# Patient Record
Sex: MALE | Race: WHITE | Employment: FULL TIME | URBAN - METROPOLITAN AREA
[De-identification: names, ages, dates, MRNs, and addresses within clinical notes are randomized per-mention and may not be internally consistent; named-entity substitution may affect disease eponyms.]

---

## 2017-01-05 ENCOUNTER — THERAPY VISIT (OUTPATIENT)
Dept: OCCUPATIONAL THERAPY | Facility: CLINIC | Age: 62
End: 2017-01-05
Payer: OTHER MISCELLANEOUS

## 2017-01-05 DIAGNOSIS — M25.522 ELBOW PAIN, LEFT: Primary | ICD-10-CM

## 2017-01-05 DIAGNOSIS — M77.12 LATERAL EPICONDYLITIS, LEFT ELBOW: ICD-10-CM

## 2017-01-05 PROCEDURE — 97110 THERAPEUTIC EXERCISES: CPT | Mod: GO | Performed by: OCCUPATIONAL THERAPIST

## 2017-01-05 PROCEDURE — 97112 NEUROMUSCULAR REEDUCATION: CPT | Mod: GO | Performed by: OCCUPATIONAL THERAPIST

## 2017-01-05 PROCEDURE — 97140 MANUAL THERAPY 1/> REGIONS: CPT | Mod: GO | Performed by: OCCUPATIONAL THERAPIST

## 2017-01-13 ENCOUNTER — THERAPY VISIT (OUTPATIENT)
Dept: OCCUPATIONAL THERAPY | Facility: CLINIC | Age: 62
End: 2017-01-13
Payer: OTHER MISCELLANEOUS

## 2017-01-13 DIAGNOSIS — M25.522 ELBOW PAIN, LEFT: Primary | ICD-10-CM

## 2017-01-13 DIAGNOSIS — M77.12 LATERAL EPICONDYLITIS, LEFT ELBOW: ICD-10-CM

## 2017-01-13 PROCEDURE — 97140 MANUAL THERAPY 1/> REGIONS: CPT | Mod: GO | Performed by: OCCUPATIONAL THERAPIST

## 2017-01-18 ENCOUNTER — TELEPHONE (OUTPATIENT)
Dept: OCCUPATIONAL THERAPY | Facility: CLINIC | Age: 62
End: 2017-01-18

## 2017-01-18 DIAGNOSIS — M25.522 ELBOW PAIN, LEFT: Primary | ICD-10-CM

## 2017-01-18 DIAGNOSIS — M77.12 LATERAL EPICONDYLITIS, LEFT ELBOW: ICD-10-CM

## 2017-01-20 ENCOUNTER — THERAPY VISIT (OUTPATIENT)
Dept: OCCUPATIONAL THERAPY | Facility: CLINIC | Age: 62
End: 2017-01-20
Payer: OTHER MISCELLANEOUS

## 2017-01-20 DIAGNOSIS — M25.522 ELBOW PAIN, LEFT: Primary | ICD-10-CM

## 2017-01-20 DIAGNOSIS — M77.12 LATERAL EPICONDYLITIS, LEFT ELBOW: ICD-10-CM

## 2017-01-20 PROCEDURE — 97110 THERAPEUTIC EXERCISES: CPT | Mod: GO | Performed by: OCCUPATIONAL THERAPIST

## 2017-01-20 PROCEDURE — 97140 MANUAL THERAPY 1/> REGIONS: CPT | Mod: GO | Performed by: OCCUPATIONAL THERAPIST

## 2017-01-20 PROCEDURE — 97112 NEUROMUSCULAR REEDUCATION: CPT | Mod: GO | Performed by: OCCUPATIONAL THERAPIST

## 2017-01-20 NOTE — PROGRESS NOTES
Hand Therapy Progress Note  Current Date:  1/20/17  Reporting Period: 12/21/2016 to 1/20/2017    Subjective:  Good through the weekend, and it builds worse and worse tooards Friday.  Anton Kaminski is a 61 year old right hand dominant male.    Diagnosis: L LEP  DOI: building for 5-6 months ago (MD order date 12/19/16)    S:  Subjective changes as noted by patient: I am doing ok.  I think it is slowly getting better.  Functional changes noted by patient: able to do some things, but when I increase use at work it starts to hurt more.     Response to previous treatment:  good  Patient has noted adverse reaction to:   None      Objective:  Pain Level Report: On scale 0-10/10  Date 12/21/2016 1/20/17   side L L   Overall 2 1   At Rest 0 0   With Activity 2 1     Primary Report: location and description  Date 12/21/2016 1/20/17   Side L L   Location LEP LEP   Radiation Slightly distally into the extensor wad and into biceps Extensor wad, into forearm   Pain Quality achy achy   Frequency intermittent Intermittant   Duration Dependent on activity During daytime, through work   Exacerbated by  Lifting, gripping, twisting Lifting, gripping, twisting, vibration   Relieved by ice heat   Progression since onset Gradually worsening Gradually getting better      Tenderness: Pain level report on scale 0-10/10  Date 12/21/16 1/20/17   Side L L   Lateral Epicondyle 2 3   PIN 4 3   ECRB Insertion 0 0     Palpation Radial Nerve Path: Pain level report on scale 0-10/10  Date 12/21/16    Side L    Supraspinatus 0    Triangular Interval 0    Spiral Groove 0    Radial Head 0    DSRN 0      Range of Motion Wrist and  Elbow AROM (PROM): WNL    AROM Multi-joint:  Date 12/21/2016 12/21/2016   Side L R   Elbow - FA - Wrist     90 - Neut - Flex 50 51   90 -  Pro  - Flex 53 58   Ext - Neut - Flex 54 56   Ext -  Pro  - Flex 51 51     Resisted Testing: MMT on scale 0-5/5, Pain level report on scale 0-10/10  Date 12/21/2016 1/20/17   Side L L   Elbow  Ext 4/5, 2/10 5/5, 0/10   Elbow Flex 4/5, 1/10 4/5, 0/10   Sup 3+/5, 3/10 4/5, 2-3/10   Pro 5/5, 0/10 NT   Wrist Ext 4/5, 1/10 4/5, 0/10   Wrist Flex 5/5, 0/10 NT   Wrist Ext c elbow extended 4/5, 1/10 4/5, 3/10   Middle Finger test 5/5, 1/10 5/5, 0/10     Strength: (Measured in pounds, pain scale 0-10/10)   with Elbow at 90: measured in pounds  Date 12/21/2016 1/20/17       Trials Left Right Left Right Left Right Left Right Left Right Left Right   1 80 72 WNL            2               3               Avg               Pain 0                 with Elbow Extended: measured in pounds  Date 12/21/2016 1/20/17       Trials Left Right Left Right Left Right Left Right Left Right Left Right   1 80 85 73 95           2               3               Avg               Pain 1                Assessment:  Response to therapy has been improvement to:  Flexibility:  improved excursion of involved muscles and less tightness in involved muscles  Strength:   and pinch  Pain:  frequency is less, intensity of pain is decreased, duration of pain is decreased and less tender over affected area    Overall Assessment:  Patient's symptoms are resolving.  Patient is ready to progress to more complex exercises.  Patient is ready to progress to next level of protocol.  Patient is becoming more independent in home exercise program  Patient would benefit from continued therapy to achieve rehab potential  STG/LTG:  STGoals have been reviewed;  see goal sheet for details and updates.  LTGoals have been reviewed;  see goal sheet for details and updates.    I have re-evaluated this patient and find that the nature, scope, duration and intensity of the therapy is appropriate for the medical condition of the patient.    P:  Frequency:  1 X week, once daily  Duration:  for 4 weeks    Treatment Plan:    Modalities:  US   Therapeutic Exercise: AROM of elbow and wrist, PROM with stretch to wrist extensors and flexors,  ECRL strengthening  progressing to ECRB strengthening (eccentric progressing to concentric ).   Manual Techniques: Radial head mobilization, interosseous membrane glide, friction massage, Myofascial release of the forearm extensors and flexors  Neuro Re-ed: Active/Passive radial nerve glides  Orthosis:  Wrist cock up orthosis     Home Program:   Exercise: AROM of elbow and wrist, PROM with stretch to wrist extensors and flexors, Active radial nerve glides  MFR to the extensor wad  Heat to extensors PRN  Activity: Avoid activities that exacerbate pain in the elbow.  Lift with forearms in neutral position.     Discharge Plan:    Achieve all LTG.  Independent in home treatment program.  Reach maximal therapeutic benefit.    Next Visit:  MFR  Passive radial nerve glides  Passive stretching

## 2017-01-31 ENCOUNTER — THERAPY VISIT (OUTPATIENT)
Dept: OCCUPATIONAL THERAPY | Facility: CLINIC | Age: 62
End: 2017-01-31
Payer: OTHER MISCELLANEOUS

## 2017-01-31 DIAGNOSIS — M25.522 ELBOW PAIN, LEFT: Primary | ICD-10-CM

## 2017-01-31 DIAGNOSIS — M77.12 LATERAL EPICONDYLITIS, LEFT ELBOW: ICD-10-CM

## 2017-01-31 PROCEDURE — 97140 MANUAL THERAPY 1/> REGIONS: CPT | Mod: GO | Performed by: OCCUPATIONAL THERAPIST

## 2017-01-31 PROCEDURE — 97112 NEUROMUSCULAR REEDUCATION: CPT | Mod: GO | Performed by: OCCUPATIONAL THERAPIST

## 2017-02-10 ENCOUNTER — THERAPY VISIT (OUTPATIENT)
Dept: OCCUPATIONAL THERAPY | Facility: CLINIC | Age: 62
End: 2017-02-10
Payer: OTHER MISCELLANEOUS

## 2017-02-10 DIAGNOSIS — M77.12 LATERAL EPICONDYLITIS, LEFT ELBOW: ICD-10-CM

## 2017-02-10 DIAGNOSIS — M25.522 ELBOW PAIN, LEFT: Primary | ICD-10-CM

## 2017-02-10 PROCEDURE — 97112 NEUROMUSCULAR REEDUCATION: CPT | Mod: GO | Performed by: OCCUPATIONAL THERAPIST

## 2017-02-10 PROCEDURE — 97140 MANUAL THERAPY 1/> REGIONS: CPT | Mod: GO | Performed by: OCCUPATIONAL THERAPIST

## 2017-02-15 ENCOUNTER — THERAPY VISIT (OUTPATIENT)
Dept: OCCUPATIONAL THERAPY | Facility: CLINIC | Age: 62
End: 2017-02-15
Payer: OTHER MISCELLANEOUS

## 2017-02-15 ENCOUNTER — TELEPHONE (OUTPATIENT)
Dept: OCCUPATIONAL THERAPY | Facility: CLINIC | Age: 62
End: 2017-02-15

## 2017-02-15 DIAGNOSIS — M25.522 ELBOW PAIN, LEFT: ICD-10-CM

## 2017-02-15 DIAGNOSIS — M77.12 LATERAL EPICONDYLITIS, LEFT ELBOW: ICD-10-CM

## 2017-02-15 PROCEDURE — 97112 NEUROMUSCULAR REEDUCATION: CPT | Mod: GO | Performed by: OCCUPATIONAL THERAPIST

## 2017-02-15 PROCEDURE — 97140 MANUAL THERAPY 1/> REGIONS: CPT | Mod: GO | Performed by: OCCUPATIONAL THERAPIST

## 2017-02-24 ENCOUNTER — THERAPY VISIT (OUTPATIENT)
Dept: OCCUPATIONAL THERAPY | Facility: CLINIC | Age: 62
End: 2017-02-24
Payer: OTHER MISCELLANEOUS

## 2017-02-24 DIAGNOSIS — M25.522 ELBOW PAIN, LEFT: ICD-10-CM

## 2017-02-24 DIAGNOSIS — M77.12 LATERAL EPICONDYLITIS, LEFT ELBOW: ICD-10-CM

## 2017-02-24 PROCEDURE — 97112 NEUROMUSCULAR REEDUCATION: CPT | Mod: GO | Performed by: OCCUPATIONAL THERAPIST

## 2017-02-24 PROCEDURE — 97140 MANUAL THERAPY 1/> REGIONS: CPT | Mod: GO | Performed by: OCCUPATIONAL THERAPIST

## 2017-03-10 ENCOUNTER — THERAPY VISIT (OUTPATIENT)
Dept: OCCUPATIONAL THERAPY | Facility: CLINIC | Age: 62
End: 2017-03-10
Payer: OTHER MISCELLANEOUS

## 2017-03-10 DIAGNOSIS — M77.12 LATERAL EPICONDYLITIS, LEFT ELBOW: ICD-10-CM

## 2017-03-10 DIAGNOSIS — M25.522 ELBOW PAIN, LEFT: ICD-10-CM

## 2017-03-10 PROCEDURE — 97140 MANUAL THERAPY 1/> REGIONS: CPT | Mod: GO | Performed by: OCCUPATIONAL THERAPIST

## 2017-03-10 PROCEDURE — 97110 THERAPEUTIC EXERCISES: CPT | Mod: GO | Performed by: OCCUPATIONAL THERAPIST

## 2017-03-17 ENCOUNTER — THERAPY VISIT (OUTPATIENT)
Dept: OCCUPATIONAL THERAPY | Facility: CLINIC | Age: 62
End: 2017-03-17
Payer: OTHER MISCELLANEOUS

## 2017-03-17 DIAGNOSIS — M77.12 LATERAL EPICONDYLITIS, LEFT ELBOW: ICD-10-CM

## 2017-03-17 DIAGNOSIS — M25.522 ELBOW PAIN, LEFT: ICD-10-CM

## 2017-03-17 PROCEDURE — 97110 THERAPEUTIC EXERCISES: CPT | Mod: GO | Performed by: OCCUPATIONAL THERAPIST

## 2017-03-17 PROCEDURE — 97140 MANUAL THERAPY 1/> REGIONS: CPT | Mod: GO | Performed by: OCCUPATIONAL THERAPIST

## 2017-03-17 NOTE — PROGRESS NOTES
Hand Therapy Progress Note  Current Date:  3/17/17  Reporting Period:  1/20/2017 3/17/17    S:  Subjective changes as noted by patient: Up and down throughout the week, bad on Wednesday, but after some hammering it's painful.  Functional changes noted by patient: able to do some things, but when I increase use at work it starts to hurt more.     Response to previous treatment:  good  Patient has noted adverse reaction to:   None      Objective:  Pain Level Report: On scale 0-10/10  Date 12/21/2016 1/20/17 3/17/17   side L L L   Overall 2 1 2   At Rest 0 0 0   With Activity 2 1 2     Primary Report: location and description  Date 12/21/2016 1/20/17 3/17/17   Side L L L   Location LEP LEP LEP   Radiation Slightly distally into the extensor wad and into biceps Extensor wad, into forearm None   Pain Quality achy achy Achy, tender   Frequency intermittent Intermittant INtermittant   Duration Dependent on activity During daytime, through work Worst at work   Exacerbated by  Lifting, gripping, twisting Lifting, gripping, twisting, vibration Lifting, gripping, twisting, vibration   Relieved by ice heat Ice   Progression since onset Gradually worsening Gradually getting better Unchanged      Tenderness: Pain level report on scale 0-10/10  Date 12/21/16 1/20/17 3/17/17   Side L L L   Lateral Epicondyle 2 3 2-3   PIN 4 3 3   ECRB Insertion 0 0 0     Palpation Radial Nerve Path: Pain level report on scale 0-10/10  Date 12/21/16    Side L    Supraspinatus 0    Triangular Interval 0    Spiral Groove 0    Radial Head 0    DSRN 0      Range of Motion Wrist and  Elbow AROM (PROM): WNL    AROM Multi-joint:  Date 12/21/2016 12/21/2016 3/17/17   Side L R L   Elbow - FA - Wrist      90 - Neut - Flex 50 51 47   90 -  Pro  - Flex 53 58 60   Ext - Neut - Flex 54 56 50   Ext -  Pro  - Flex 51 51 57     Resisted Testing: MMT on scale 0-5/5, Pain level report on scale 0-10/10  Date 12/21/2016 1/20/17 3/17/17   Side L L L   Elbow Ext 4/5, 2/10  5/5, 0/10 NT   Elbow Flex 4/5, 1/10 4/5, 0/10 5/5, 0/10   Sup 3+/5, 3/10 4/5, 2-3/10 5/5, 2/10   Pro 5/5, 0/10 NT NT   Wrist Ext 4/5, 1/10 4/5, 0/10 4/5, 2/10   Wrist Flex 5/5, 0/10 NT NT   Wrist Ext c elbow extended 4/5, 1/10 4/5, 3/10 4/5, 3-4/5   Middle Finger test 5/5, 1/10 5/5, 0/10 4/5, 3-4/10     Strength: (Measured in pounds, pain scale 0-10/10)   with Elbow at 90: measured in pounds  Date 12/21/2016 1/20/17       Trials Left Right Left Right Left Right Left Right Left Right Left Right   1 80 72 WNL            2               3               Avg               Pain 0                 with Elbow Extended: measured in pounds  Date 12/21/2016 1/20/17 3/17/17      Trials Left Right Left Right Left Right Left Right Left Right Left Right   1 80 85 73 95 51 88         2               3               Avg               Pain 1    3            Assessment:  Response to therapy has been lack of progress in:  Strength:   and pinch  Pain:  Generally unchanged    Overall Assessment:  Patient would benefit from continued therapy to achieve rehab potential  STG/LTG:  STGoals have been reviewed;  see goal sheet for details and updates.  LTGoals have been reviewed;  see goal sheet for details and updates.    I have re-evaluated this patient and find that the nature, scope, duration and intensity of the therapy is appropriate for the medical condition of the patient.    P:  Frequency:  1 X week, once daily  Duration:  for 6 weeks    Treatment Plan:    Modalities:  US   Therapeutic Exercise: AROM of elbow and wrist, PROM with stretch to wrist extensors and flexors,  ECRL strengthening progressing to ECRB strengthening (eccentric progressing to concentric ).   Manual Techniques: Radial head mobilization, interosseous membrane glide, friction massage, Myofascial release of the forearm extensors and flexors  Neuro Re-ed: Active/Passive radial nerve glides  Orthosis:  Wrist cock up orthosis     Home Program:   Exercise:  AROM of elbow and wrist, PROM with stretch to wrist extensors and flexors, Active radial nerve glides  MFR to the extensor wad  Heat to extensors PRN  Activity: Avoid activities that exacerbate pain in the elbow.  Lift with forearms in neutral position.     Discharge Plan:    Achieve all LTG.  Independent in home treatment program.  Reach maximal therapeutic benefit.    Next Visit:  MFR  Passive radial nerve glides  Passive stretching

## 2017-03-20 ENCOUNTER — OFFICE VISIT (OUTPATIENT)
Dept: ORTHOPEDICS | Facility: CLINIC | Age: 62
End: 2017-03-20
Payer: OTHER MISCELLANEOUS

## 2017-03-20 VITALS
SYSTOLIC BLOOD PRESSURE: 138 MMHG | HEIGHT: 70 IN | BODY MASS INDEX: 33.36 KG/M2 | WEIGHT: 233 LBS | DIASTOLIC BLOOD PRESSURE: 86 MMHG

## 2017-03-20 DIAGNOSIS — M77.12 LATERAL EPICONDYLITIS OF LEFT ELBOW: Primary | ICD-10-CM

## 2017-03-20 PROCEDURE — 29065 APPL CST SHO TO HAND LNG ARM: CPT | Mod: LT | Performed by: ORTHOPAEDIC SURGERY

## 2017-03-20 PROCEDURE — 99212 OFFICE O/P EST SF 10 MIN: CPT | Mod: 25 | Performed by: ORTHOPAEDIC SURGERY

## 2017-03-20 NOTE — LETTER
"  3/20/2017       RE: Anton Kaminski  [address] [place] [address]           Dear Colleague,    Thank you for referring your patient, Anton Kaminski, to the [facility]. Please see a copy of my visit note below.    HISTORY OF PRESENT ILLNESS:    Anton Kaminski is a 62 year old male who is seen in follow up for left elbow, lateral epicondylitis (WC).  Present symptoms: Pt states elbow has good days and bad, today is a good day.  Pt continues to have pain over the lateral aspect of the elbow.  Treatments tried to this point: hand therapy, cortisone injections  Past Medical History: Unchanged from the visit of 11/3/2016. Please refer to that note.    REVIEW OF SYSTEMS:  CONSTITUTIONAL: NEGATIVE for fever, chills, change in weight  INTEGUMENTARY/SKIN: NEGATIVE for worrisome rashes, moles or lesions  EYES: NEGATIVE for vision changes or irritation  ENT/MOUTH: NEGATIVE for ear, mouth and throat problems  RESP: NEGATIVE for significant cough or SOB  BREAST: NEGATIVE for masses, tenderness or discharge  CV: NEGATIVE for chest pain, palpitations or peripheral edema  GI: NEGATIVE for nausea, abdominal pain, heartburn, or change in bowel habits  : Negative   MUSCULOSKELETAL: See HPI above  NEURO: NEGATIVE for weakness, dizziness or paresthesias  ENDOCRINE: NEGATIVE for temperature intolerance, skin/hair changes  HEME/ALLERGY/IMMUNE: NEGATIVE for bleeding problems  PSYCHIATRIC: NEGATIVE for changes in mood or affect    PHYSICAL EXAM:  /86 (BP Location: Right arm, Patient Position: Chair, Cuff Size: Adult Regular)  Ht 5' 9.9\" (1.775 m)  Wt 233 lb (105.7 kg)  BMI 33.53 kg/m2  Body mass index is 33.53 kg/(m^2).   GENERAL APPEARANCE: healthy, alert and no distress   SKIN: no suspicious lesions or rashes  NEURO: Normal strength and tone, mentation intact and speech normal  VASCULAR:  good pulses, and cappillary refill   LYMPH: no lymphadenopathy   PSYCH:  mentation appears normal and affect " normal/bright    MSK:  Continuing pain at the lateral epicondyle, Left elbow  Discomfort with extreme flexion and extension, left elbow      IMAGING INTERPRETATION:  None taking   ASSESSMENT:  Chronic tennis elbow (lateral epicondylitis), left    PLAN:  Since he has continuing pain with cortisone injection and physical therapy, we talked about complete immobilization using a long-arm cast area  Because it has been only three months, considering any surgical intervention would be too premature.  He wants to try immobilization with a long-arm cast which will be applied today  Follow up in three weeks.  In the meantime, he'll be given a note for him to work with only right hand.               Norm Gallegos MD  Dept. Orthopedic Surgery  [facility]       Disclaimer: This note consists of symbols derived from keyboarding, dictation and/or voice recognition software. As a result, there may be errors in the script that have gone undetected. Please consider this when interpreting information found in this chart.      Again, thank you for allowing me to participate in the care of your patient.        Sincerely,    Norm Gallegos MD

## 2017-03-20 NOTE — NURSING NOTE
"Chief Complaint   Patient presents with     RECHECK     Left elbow       Initial /86 (BP Location: Right arm, Patient Position: Chair, Cuff Size: Adult Regular)  Ht 5' 9.9\" (1.775 m)  Wt 233 lb (105.7 kg)  BMI 33.53 kg/m2 Estimated body mass index is 33.53 kg/(m^2) as calculated from the following:    Height as of this encounter: 5' 9.9\" (1.775 m).    Weight as of this encounter: 233 lb (105.7 kg).  Medication Reconciliation: complete    "

## 2017-03-20 NOTE — PROGRESS NOTES
"HISTORY OF PRESENT ILLNESS:    Anton Kaminski is a 62 year old male who is seen in follow up for left elbow, lateral epicondylitis (WC).  Present symptoms: Pt states elbow has good days and bad, today is a good day.  Pt continues to have pain over the lateral aspect of the elbow.  Treatments tried to this point: hand therapy, cortisone injections  Past Medical History: Unchanged from the visit of 11/3/2016. Please refer to that note.    REVIEW OF SYSTEMS:  CONSTITUTIONAL: NEGATIVE for fever, chills, change in weight  INTEGUMENTARY/SKIN: NEGATIVE for worrisome rashes, moles or lesions  EYES: NEGATIVE for vision changes or irritation  ENT/MOUTH: NEGATIVE for ear, mouth and throat problems  RESP: NEGATIVE for significant cough or SOB  BREAST: NEGATIVE for masses, tenderness or discharge  CV: NEGATIVE for chest pain, palpitations or peripheral edema  GI: NEGATIVE for nausea, abdominal pain, heartburn, or change in bowel habits  : Negative   MUSCULOSKELETAL: See HPI above  NEURO: NEGATIVE for weakness, dizziness or paresthesias  ENDOCRINE: NEGATIVE for temperature intolerance, skin/hair changes  HEME/ALLERGY/IMMUNE: NEGATIVE for bleeding problems  PSYCHIATRIC: NEGATIVE for changes in mood or affect    PHYSICAL EXAM:  /86 (BP Location: Right arm, Patient Position: Chair, Cuff Size: Adult Regular)  Ht 5' 9.9\" (1.775 m)  Wt 233 lb (105.7 kg)  BMI 33.53 kg/m2  Body mass index is 33.53 kg/(m^2).   GENERAL APPEARANCE: healthy, alert and no distress   SKIN: no suspicious lesions or rashes  NEURO: Normal strength and tone, mentation intact and speech normal  VASCULAR:  good pulses, and cappillary refill   LYMPH: no lymphadenopathy   PSYCH:  mentation appears normal and affect normal/bright    MSK:  Continuing pain at the lateral epicondyle, Left elbow  Discomfort with extreme flexion and extension, left elbow      IMAGING INTERPRETATION:  None taking   ASSESSMENT:  Chronic tennis elbow (lateral epicondylitis), " left    PLAN:  Since he has continuing pain with cortisone injection and physical therapy, we talked about complete immobilization using a long-arm cast area  Because it has been only three months, considering any surgical intervention would be too premature.  He wants to try immobilization with a long-arm cast which will be applied today  Follow up in three weeks.  In the meantime, he'll be given a note for him to work with only right hand.               Norm Gallegos MD  Dept. Orthopedic Surgery  [facility]       Disclaimer: This note consists of symbols derived from keyboarding, dictation and/or voice recognition software. As a result, there may be errors in the script that have gone undetected. Please consider this when interpreting information found in this chart.

## 2017-03-20 NOTE — LETTER
[facility]  [address]  [place] [facility] [address]  [phone]        March 20, 2017    RE:Anton Kaminski  [address] [place] [address]    1955              To whom it may concern:    Anton Kaminski is under my professional care for lateral epicondylitis.   He may return to work on or about 3/21/2017 with the following: Light duty-Right handed job duties only.        Sincerely,        Norm Gallegos MD

## 2017-03-20 NOTE — PATIENT INSTRUCTIONS
Follow up in 3 weeks       Caring for Your Cast     A cast is used to protect an injured body part and allow it to heal by limiting the amount of motion occurring around the injury. Pain and swelling of the injured area is normal for 48 hours after your cast is put on. If you have swelling, wiggle your toes or fingers to ease it. Doing so encourages blood flow to your arm or leg.     It is important that you keep your cast dry, unless your doctor tells you differently. If the padding of the cast gets wet, your skin may be damaged and become infected. When showering or taking a bath, put the cast in a heavy plastic bag that can be held in place with a rubber band. If your cast gets wet and does not dry out in four to five hours, call your doctor s office.   To keep the cast clean, use wash clothes or baby wipes around it.   You may experience some itching inside the cast. This is normal. Avoid putting anything in the cast, even your finger, as you can injure your skin and cause infection. Try shaking some talcum powder or blowing cool air from a hair dryer into the cast to ease itching.   If these signs or symptoms develop, call your doctor immediately.       Pain gets worse     Swelling that cuts off blood flow that does not go away, even when you lift the body part above the level of your heart     Fever after itching. It may be related to an infection.     Fluid draining from your skin under the cast     Your cast may become loose as swelling goes down. If the cast feels too loose or if it is so loose you can take it off, call your doctor s office.     Your doctor or  will give you recommendations for activity based on your injury. Some sports allow casts if properly padded by a doctor or .     For complete healing, your cast should only be removed at the direction of your doctor or clinic staff. A special saw ensures its safe removal and protects the skin and other tissue under the  cast.

## 2017-03-20 NOTE — MR AVS SNAPSHOT
After Visit Summary   3/20/2017    Anton Kaminski    MRN: [id number]           Patient Information     Date Of Birth          1955        Visit Information        Provider Department      3/20/2017 2:40 PM Norm Gallegos MD [facility]TH[facility]ED[facility] SURGERY        Care Instructions    Follow up in 3 weeks       Caring for Your Cast     A cast is used to protect an injured body part and allow it to heal by limiting the amount of motion occurring around the injury. Pain and swelling of the injured area is normal for 48 hours after your cast is put on. If you have swelling, wiggle your toes or fingers to ease it. Doing so encourages blood flow to your arm or leg.     It is important that you keep your cast dry, unless your doctor tells you differently. If the padding of the cast gets wet, your skin may be damaged and become infected. When showering or taking a bath, put the cast in a heavy plastic bag that can be held in place with a rubber band. If your cast gets wet and does not dry out in four to five hours, call your doctor s office.   To keep the cast clean, use wash clothes or baby wipes around it.   You may experience some itching inside the cast. This is normal. Avoid putting anything in the cast, even your finger, as you can injure your skin and cause infection. Try shaking some talcum powder or blowing cool air from a hair dryer into the cast to ease itching.   If these signs or symptoms develop, call your doctor immediately.       Pain gets worse     Swelling that cuts off blood flow that does not go away, even when you lift the body part above the level of your heart     Fever after itching. It may be related to an infection.     Fluid draining from your skin under the cast     Your cast may become loose as swelling goes down. If the cast feels too loose or if it is so loose you can take it off, call your doctor s office.     Your doctor or  will give you recommendations  "for activity based on your injury. Some sports allow casts if properly padded by a doctor or .     For complete healing, your cast should only be removed at the direction of your doctor or clinic staff. A special saw ensures its safe removal and protects the skin and other tissue under the cast.           Follow-ups after your visit        Your next 10 appointments already scheduled     Mar 31, 2017  1:30 PM CDT   [facility] with Arleth Terry   [facility] [place] [facility] ([facility]  )    [address]  [address]  [place] [facility] [address]   [phone]            Apr 07, 2017  2:00 PM CDT   [facility] Hand with Jelly Gonzalez OT   [facility] [place] [facility] ([facility]  )    [address]  [address]  [place] [facility] [address]   [phone]              Who to contact     If you have questions or need follow up information about today's clinic visit or your schedule please contact [facility]THOPED[facility] SURGERY directly at [phone].  Normal or non-critical lab and imaging results will be communicated to you by [employer], letter or phone within 4 business days after the clinic has received the results. If you do not hear from us within 7 days, please contact the clinic through [employer] or phone. If you have a critical or abnormal lab result, we will notify you by phone as soon as possible.  Submit refill requests through [email or web address] or call your pharmacy and they will forward the refill request to us. Please allow 3 business days for your refill to be completed.          Additional Information About Your Visit        [email or web address] Information     [email or web address] lets you send messages to your doctor, view your test results, renew your prescriptions, schedule appointments and more. To sign up, go to [email or web address] . Click on \"Log in\" on the left side of the screen, which will take you to the Welcome page. Then click on \"Sign up Now\" on the right side of the page.     You will be asked to enter the access code " "listed below, as well as some personal information. Please follow the directions to create your username and password.     Your access code is: [email or web address]  Expires: 2017 11:56 AM     Your access code will  in 90 days. If you need help or a new code, please call your [facility] or [phone].        Care EveryWhere ID     This is your Care EveryWhere ID. This could be used by other organizations to access your [facility] medical records  [id number]        Your Vitals Were     Height BMI (Body Mass Index)                5' 9.9\" (1.775 m) 33.53 kg/m2           Blood Pressure from Last 3 Encounters:   17 138/86   16 (!) 156/98   14 143/86    Weight from Last 3 Encounters:   17 233 lb (105.7 kg)   16 230 lb (104.3 kg)   16 230 lb (104.3 kg)              Today, you had the following     No orders found for display       Primary Care Provider Office Phone # Fax #    Brian Hickman [phone] [address] [place] [address]        Thank you!     Thank you for choosing [facility] SURG[facility]  for your care. Our goal is always to provide you with excellent care. Hearing back from our patients is one way we can continue to improve our services. Please take a few minutes to complete the written survey that you may receive in the mail after your visit with us. Thank you!             Your Updated Medication List - Protect others around you: Learn how to safely use, store and throw away your medicines at www.disposemymeds.org.          This list is accurate as of: 3/20/17  3:03 PM.  Always use your most recent med list.                   Brand Name Dispense Instructions for use    FLEXERIL PO      Take 5 mg by mouth daily as needed Reported on 3/20/2017       OMEPRAZOLE PO      Take 40 mg by mouth daily         "

## 2017-04-07 ENCOUNTER — OFFICE VISIT (OUTPATIENT)
Dept: ORTHOPEDICS | Facility: CLINIC | Age: 62
End: 2017-04-07
Payer: OTHER MISCELLANEOUS

## 2017-04-07 VITALS
SYSTOLIC BLOOD PRESSURE: 144 MMHG | WEIGHT: 233 LBS | HEIGHT: 70 IN | DIASTOLIC BLOOD PRESSURE: 92 MMHG | BODY MASS INDEX: 33.36 KG/M2

## 2017-04-07 DIAGNOSIS — M77.12 LATERAL EPICONDYLITIS OF LEFT ELBOW: Primary | ICD-10-CM

## 2017-04-07 PROCEDURE — 99212 OFFICE O/P EST SF 10 MIN: CPT | Performed by: ORTHOPAEDIC SURGERY

## 2017-04-07 NOTE — NURSING NOTE
"Chief Complaint   Patient presents with     Elbow left     lateral epicondylitis, cast removal       Initial BP (!) 144/92 (BP Location: Right arm, Patient Position: Chair, Cuff Size: Adult Regular)  Ht 5' 9.9\" (1.775 m)  Wt 233 lb (105.7 kg)  BMI 33.53 kg/m2 Estimated body mass index is 33.53 kg/(m^2) as calculated from the following:    Height as of this encounter: 5' 9.9\" (1.775 m).    Weight as of this encounter: 233 lb (105.7 kg).  Medication Reconciliation: complete    "

## 2017-04-07 NOTE — LETTER
"  4/7/2017       RE: Anton Kaminski  [address] [place] [address]           Dear Colleague,    Thank you for referring your patient, Anton Kaminski, to the [facility] ORTHOPEDIC SURGERY. Please see a copy of my visit note below.    HISTORY OF PRESENT ILLNESS:    Anton Kaminski is a 62 year old male who is seen in follow up for left elbow, lateral epicondylitis.  Present symptoms: Pt states he does have a little pain in the elbow at times, states this is more of an ache that has been infrequent.  Overall this is less pain than prior to casting.  Treatments tried to this point: hand therapy, cortisone injections, long arm cast  Past Medical History: Unchanged from the visit of 11/3/2016. Please refer to that note.    REVIEW OF SYSTEMS:  CONSTITUTIONAL: NEGATIVE for fever, chills, change in weight  INTEGUMENTARY/SKIN: NEGATIVE for worrisome rashes, moles or lesions  EYES: NEGATIVE for vision changes or irritation  ENT/MOUTH: NEGATIVE for ear, mouth and throat problems  RESP: NEGATIVE for significant cough or SOB  BREAST: NEGATIVE for masses, tenderness or discharge  CV: NEGATIVE for chest pain, palpitations or peripheral edema  GI: NEGATIVE for nausea, abdominal pain, heartburn, or change in bowel habits  : Negative   MUSCULOSKELETAL: See HPI above  NEURO: NEGATIVE for weakness, dizziness or paresthesias  ENDOCRINE: NEGATIVE for temperature intolerance, skin/hair changes  HEME/ALLERGY/IMMUNE: NEGATIVE for bleeding problems  PSYCHIATRIC: NEGATIVE for changes in mood or affect    PHYSICAL EXAM:  BP (!) 144/92 (BP Location: Right arm, Patient Position: Chair, Cuff Size: Adult Regular)  Ht 5' 9.9\" (1.775 m)  Wt 233 lb (105.7 kg)  BMI 33.53 kg/m2  Body mass index is 33.53 kg/(m^2).   GENERAL APPEARANCE: healthy, alert and no distress   SKIN: no suspicious lesions or rashes  NEURO: Normal strength and tone, mentation intact and speech normal  VASCULAR:  good pulses, and cappillary refill   LYMPH: no " lymphadenopathy   PSYCH:  mentation appears normal and affect normal/bright    MSK:  Skin is intact coming out of the cast  As expected elbow motion is stiff  Distal neurovascular status is intact      IMAGING INTERPRETATION:  None taken today   ASSESSMENT:  Lateral epicondylitis, left    PLAN:  A note will be given for working with the right hand only for next two weeks  If he has persisting problem, surgery may be the last option for him.  Follow-up as needed.           Norm Gallegos MD  Dept. Orthopedic Surgery  [facility]       Disclaimer: This note consists of symbols derived from keyboarding, dictation and/or voice recognition software. As a result, there may be errors in the script that have gone undetected. Please consider this when interpreting information found in this chart.      Again, thank you for allowing me to participate in the care of your patient.        Sincerely,              Norm Gallegos MD

## 2017-04-07 NOTE — LETTER
[facility]  [address]  [facility] [place] [facility] [address]  [phone]        April 7, 2017    RE:Anton Kaminski  [address] [place] [address]    1955              To whom it may concern:      Anton Kaminski is under my professional care for left lateral epicondylitis.   He  may return to work on or about 4/10/2017 with the following: right hand use only.    These restrictions are for 2 weeks and will end on 4/24/2017.      Sincerely,        Norm Gallegos MD

## 2017-04-07 NOTE — MR AVS SNAPSHOT
"              After Visit Summary   2017    Anton Kaminski    MRN: [id number]           Patient Information     Date Of Birth          1955        Visit Information        Provider Department      2017 10:10 AM Norm Gallegos MD [facility] ORTHOPEDIC SURGERY        Today's Diagnoses     Lateral epicondylitis of left elbow    -  1       Follow-ups after your visit        Who to contact     If you have questions or need follow up information about today's clinic visit or your schedule please contact [facility] ORTHOPEDIC SURGERY directly at [phone].  Normal or non-critical lab and imaging results will be communicated to you by [employer], letter or phone within 4 business days after the clinic has received the results. If you do not hear from us within 7 days, please contact the clinic through [employer] or phone. If you have a critical or abnormal lab result, we will notify you by phone as soon as possible.  Submit refill requests through [employer] or call your pharmacy and they will forward the refill request to us. Please allow 3 business days for your refill to be completed.          Additional Information About Your Visit        MyChart Information     [employer] lets you send messages to your doctor, view your test results, renew your prescriptions, schedule appointments and more. To sign up, go to [email or web address] . Click on \"Log in\" on the left side of the screen, which will take you to the Welcome page. Then click on \"Sign up Now\" on the right side of the page.     You will be asked to enter the access code listed below, as well as some personal information. Please follow the directions to create your username and password.     Your access code is: [email or web address]  Expires: 2017 11:56 AM     Your access code will  in 90 days. If you need help or a new code, please call your [facility] clinic or [phone].        Care EveryWhere ID     This is your Care EveryWhere ID. This " "could be used by other organizations to access your [facility] medical records  [id number]        Your Vitals Were     Height BMI (Body Mass Index)                5' 9.9\" (1.775 m) 33.53 kg/m2           Blood Pressure from Last 3 Encounters:   04/07/17 (!) 144/92   03/20/17 138/86   12/19/16 (!) 156/98    Weight from Last 3 Encounters:   04/07/17 233 lb (105.7 kg)   03/20/17 233 lb (105.7 kg)   12/19/16 230 lb (104.3 kg)              Today, you had the following     No orders found for display       Primary Care Provider Office Phone # Fax #    Brian Hickman [phone] [address] [place] [address]        Thank you!     Thank you for choosing [facility] ORTHOPEDIC SURGERY  for your care. Our goal is always to provide you with excellent care. Hearing back from our patients is one way we can continue to improve our services. Please take a few minutes to complete the written survey that you may receive in the mail after your visit with us. Thank you!             Your Updated Medication List - Protect others around you: Learn how to safely use, store and throw away your medicines at www.disposemymeds.org.          This list is accurate as of: 4/7/17 10:47 AM.  Always use your most recent med list.                   Brand Name Dispense Instructions for use    FLEXERIL PO      Take 5 mg by mouth daily as needed Reported on 4/7/2017       OMEPRAZOLE PO      Take 40 mg by mouth daily         "

## 2017-04-07 NOTE — PROGRESS NOTES
"HISTORY OF PRESENT ILLNESS:    Anton Kaminski is a 62 year old male who is seen in follow up for left elbow, lateral epicondylitis.  Present symptoms: Pt states he does have a little pain in the elbow at times, states this is more of an ache that has been infrequent.  Overall this is less pain than prior to casting.  Treatments tried to this point: hand therapy, cortisone injections, long arm cast  Past Medical History: Unchanged from the visit of 11/3/2016. Please refer to that note.    REVIEW OF SYSTEMS:  CONSTITUTIONAL: NEGATIVE for fever, chills, change in weight  INTEGUMENTARY/SKIN: NEGATIVE for worrisome rashes, moles or lesions  EYES: NEGATIVE for vision changes or irritation  ENT/MOUTH: NEGATIVE for ear, mouth and throat problems  RESP: NEGATIVE for significant cough or SOB  BREAST: NEGATIVE for masses, tenderness or discharge  CV: NEGATIVE for chest pain, palpitations or peripheral edema  GI: NEGATIVE for nausea, abdominal pain, heartburn, or change in bowel habits  : Negative   MUSCULOSKELETAL: See HPI above  NEURO: NEGATIVE for weakness, dizziness or paresthesias  ENDOCRINE: NEGATIVE for temperature intolerance, skin/hair changes  HEME/ALLERGY/IMMUNE: NEGATIVE for bleeding problems  PSYCHIATRIC: NEGATIVE for changes in mood or affect    PHYSICAL EXAM:  BP (!) 144/92 (BP Location: Right arm, Patient Position: Chair, Cuff Size: Adult Regular)  Ht 5' 9.9\" (1.775 m)  Wt 233 lb (105.7 kg)  BMI 33.53 kg/m2  Body mass index is 33.53 kg/(m^2).   GENERAL APPEARANCE: healthy, alert and no distress   SKIN: no suspicious lesions or rashes  NEURO: Normal strength and tone, mentation intact and speech normal  VASCULAR:  good pulses, and cappillary refill   LYMPH: no lymphadenopathy   PSYCH:  mentation appears normal and affect normal/bright    MSK:  Skin is intact coming out of the cast  As expected elbow motion is stiff  Distal neurovascular status is intact      IMAGING INTERPRETATION:  None taken today "   ASSESSMENT:  Lateral epicondylitis, left    PLAN:  A note will be given for working with the right hand only for next two weeks  If he has persisting problem, surgery may be the last option for him.  Follow-up as needed.           Norm Gallegos MD  Dept. Orthopedic Surgery  [facility]       Disclaimer: This note consists of symbols derived from keyboarding, dictation and/or voice recognition software. As a result, there may be errors in the script that have gone undetected. Please consider this when interpreting information found in this chart.

## 2017-04-13 ENCOUNTER — TELEPHONE (OUTPATIENT)
Dept: ORTHOPEDICS | Facility: CLINIC | Age: 62
End: 2017-04-13

## 2017-04-13 NOTE — TELEPHONE ENCOUNTER
Request received from [employer], work comp. Office visit note from 4/7/17 faxed back to number below, attn: Charbel Epperson RN

## 2017-04-13 NOTE — TELEPHONE ENCOUNTER
Received voicemail from Charbel BERGMAN From [employer], work comp. He would like a copy of office visit notes from 4/10/17 faxed to him at : [phone].   He may be reached at [phone] if no appointment or for questions.     Phone call to Charbel and informed we need a written request for information. Fax number given. He states he will send over within the next few minute.     Will await request.     JUAN Epperson RN

## 2017-04-27 NOTE — PROGRESS NOTES
Pt has not returned for therapy since 3/17/17.  Assume all goals are met to pt satisfaction.  D/C [facility]

## 2017-05-24 ENCOUNTER — TELEPHONE (OUTPATIENT)
Dept: ORTHOPEDICS | Facility: CLINIC | Age: 62
End: 2017-05-24

## 2017-05-24 NOTE — TELEPHONE ENCOUNTER
Our goal is to complete and return forms within 5-7 business days of receiving the request.    Type of form Requested: Health Care Provider Report  Form requested by (include company):   [employer]    Phone number for requestor: [phone]  Date form is requested by: w/in 10 days    How will form be returned?:  fax to [phone]  Has the patient signed a consent form for release of information (may be included with form)? NA  Additional comments: pt was last seen in clinic on 4/7/2017 for lateral epicondylitis of left elbow    Form was started and place in brown accordion file for provider review/ completion at [facility].

## 2017-09-06 ENCOUNTER — TELEPHONE (OUTPATIENT)
Dept: ORTHOPEDICS | Facility: CLINIC | Age: 62
End: 2017-09-06

## 2017-09-06 NOTE — TELEPHONE ENCOUNTER
Charbel had called and left voicemail looking to update the disability form that was sent last April. At that time it was noted that the patient had not met MMI as it was too early to determine. The last work letter stated that he was released to full duty as tolerated on 4/24/17. The patient has been back to full duty since then per Charbel. He just needs a letter stating that the patient is at MMI and Charbel can then close the case.      Yovani Figueroa, ATC